# Patient Record
(demographics unavailable — no encounter records)

---

## 2020-01-28 NOTE — REP
PEDIATRIC CHEST:

 

Two views

 

There is thickening of perihilar markings with peribronchial cuffing, suggesting

a viral etiology or reactive airway disease.  No consolidating infiltrate is

seen.  The heart is normal in size. The mediastinal silhouette is unremarkable.

The visualized osseous structures are intact.

 

IMPRESSION:

 

Findings compatible with viral pneumonitis or reactive airway disease. No

consolidating infiltrate.

 

 

Electronically Signed by

Waldo Gutierrez MD 01/29/2020 10:25 P